# Patient Record
Sex: FEMALE | Race: ASIAN | Employment: FULL TIME
[De-identification: names, ages, dates, MRNs, and addresses within clinical notes are randomized per-mention and may not be internally consistent; named-entity substitution may affect disease eponyms.]

---

## 2017-12-17 ENCOUNTER — HEALTH MAINTENANCE LETTER (OUTPATIENT)
Age: 57
End: 2017-12-17

## 2018-01-25 ENCOUNTER — TELEPHONE (OUTPATIENT)
Dept: CARDIOLOGY | Facility: CLINIC | Age: 58
End: 2018-01-25

## 2018-01-25 NOTE — TELEPHONE ENCOUNTER
Phone call back top patient to explain to her Dr. Hogan's impressions and recommendation. I told her that its unlikely that a 30 day monitor would  any arrhythmia and she agreed and that this may be the only time she will experience an episode like she had again. She is concerned about bradycardia and if this would have been the cause. She was told that after SVT ablation heart rates will become lower. I told her that I was not aware of this. She runs 30-40 miles a week and if she has any bradycardia it could be from her high level of fitness. She will keep monitoring her HR's with her Fitbit and if there is concern she will call us. So for now, she will defer on a 30 day event monitor. I gave her the afib clinic number. [email or web address]

## 2018-01-25 NOTE — TELEPHONE ENCOUNTER
It sounds like a vasovagal syncope. An ECG event monitor for 1 month is reasonable unless pt refuses.

## 2018-01-25 NOTE — TELEPHONE ENCOUNTER
Patient called in to report an episode of syncope that occurred this past Wednesday. She is s/p SVT ablation from 5/2012 and last saw Dr. Hogan on 7/14/14. At that time she was doing well w/o any recurrent tachycardic episodes.  She was awakened at 4 am Wednesday with heart burn/stomach discomfort. She took an antacid pill and went back to bed. She then became nauseated and got up to go to the bathroom and then woke finding herself on the bathroom floor. She does not recall how long she was there-She was not injured.She does not recall her heart racing,fluttering or skipping. She felt like she was 'in a dream'. She returned to bed and felt very cold.She rested all day Wednesday and returned to work today feeling fine.  She was fine on Tuesday evening.She has been keeping herself well hydrated.In fact, she has been in good health for the past 3-4 years with no episodes of tachycardia.  I told her that I will have Dr. Hogan review and then get back to her with his impressions. Enrique

## 2020-03-02 ENCOUNTER — HEALTH MAINTENANCE LETTER (OUTPATIENT)
Age: 60
End: 2020-03-02

## 2020-12-20 ENCOUNTER — HEALTH MAINTENANCE LETTER (OUTPATIENT)
Age: 60
End: 2020-12-20

## 2021-04-24 ENCOUNTER — HEALTH MAINTENANCE LETTER (OUTPATIENT)
Age: 61
End: 2021-04-24

## 2021-10-03 ENCOUNTER — HEALTH MAINTENANCE LETTER (OUTPATIENT)
Age: 61
End: 2021-10-03

## 2022-03-20 ENCOUNTER — HEALTH MAINTENANCE LETTER (OUTPATIENT)
Age: 62
End: 2022-03-20

## 2022-05-15 ENCOUNTER — HEALTH MAINTENANCE LETTER (OUTPATIENT)
Age: 62
End: 2022-05-15

## 2022-09-10 ENCOUNTER — HEALTH MAINTENANCE LETTER (OUTPATIENT)
Age: 62
End: 2022-09-10

## 2023-06-03 ENCOUNTER — HEALTH MAINTENANCE LETTER (OUTPATIENT)
Age: 63
End: 2023-06-03